# Patient Record
Sex: MALE | Race: WHITE | HISPANIC OR LATINO | URBAN - METROPOLITAN AREA
[De-identification: names, ages, dates, MRNs, and addresses within clinical notes are randomized per-mention and may not be internally consistent; named-entity substitution may affect disease eponyms.]

---

## 2019-06-16 ENCOUNTER — EMERGENCY (EMERGENCY)
Facility: HOSPITAL | Age: 44
LOS: 1 days | Discharge: HOME | End: 2019-06-16
Attending: EMERGENCY MEDICINE
Payer: COMMERCIAL

## 2019-06-16 VITALS
OXYGEN SATURATION: 95 % | RESPIRATION RATE: 16 BRPM | HEIGHT: 66 IN | HEART RATE: 81 BPM | DIASTOLIC BLOOD PRESSURE: 87 MMHG | SYSTOLIC BLOOD PRESSURE: 133 MMHG | TEMPERATURE: 99 F | WEIGHT: 240.08 LBS

## 2019-06-16 PROBLEM — Z00.00 ENCOUNTER FOR PREVENTIVE HEALTH EXAMINATION: Status: ACTIVE | Noted: 2019-06-16

## 2019-06-16 PROCEDURE — 73120 X-RAY EXAM OF HAND: CPT

## 2019-06-16 PROCEDURE — 99283 EMERGENCY DEPT VISIT LOW MDM: CPT

## 2019-06-16 PROCEDURE — 73120 X-RAY EXAM OF HAND: CPT | Mod: 26,RT

## 2019-06-16 RX ORDER — ACETAMINOPHEN 500 MG
975 TABLET ORAL ONCE
Refills: 0 | Status: COMPLETED | OUTPATIENT
Start: 2019-06-16 | End: 2019-06-16

## 2019-06-16 RX ADMIN — Medication 975 MILLIGRAM(S): at 11:26

## 2019-06-16 NOTE — ED PROVIDER NOTE - MUSCULOSKELETAL MINIMAL EXAM
R hand normal pulse. No R hand focal point tenderness. R hand ROM limited actively not clear if due to patient effort or reluctance to pain. Good strength in other muscles.

## 2019-06-16 NOTE — ED PROVIDER NOTE - CLINICAL SUMMARY MEDICAL DECISION MAKING FREE TEXT BOX
44 y/o M patient presents to the ED w/ R hand pain. Likely dislocation or fracture. No ligamentous injury. Will do X-Ray. Likely require splint and hand surgery. 44 y/o M patient presents to the ED w/ R hand pain. Likely dislocation or fracture. No ligamentous injury. Will do X-Ray. Likely require splint and hand surgery follow-up.

## 2019-06-16 NOTE — ED PROVIDER NOTE - CARE PROVIDER_API CALL
Mallory Garcia)  Plastic Surgery  [address] [place] [id number]  Phone: [phone]  Fax: [phone]  Follow Up Time: 4-6 Days

## 2019-06-16 NOTE — ED PROCEDURE NOTE - NS ED PROC PERFORMED BY1 FT
padilla
impairments found/rehab potential/anticipated discharge recommendation/functional limitations in following categories

## 2019-06-16 NOTE — ED PROVIDER NOTE - NSFOLLOWUPINSTRUCTIONS_ED_ALL_ED_FT
Please follow up with your personal medical doctor in 24-48 hours.   Bring results from today to your visit.  Take 400mg of motrin or advil or ibuprofen (usually 2 tablets) every 4 hours for pain.   See hand surgeon within 1 wk.   If your symptoms change, get worse or if you have any new symptoms, come to the ER right away.  If you have any questions, call the ER at the phone number on this page.

## 2019-06-16 NOTE — ED PROVIDER NOTE - OBJECTIVE STATEMENT
44 y/o M patient presents to the ED w/ mild, sharp, constant R hand pain that began this morning after patient was lifting luggage at work. Patient reports of pain all over in his thumb. Patient notes he applied ice with mild relief and he notes he is a righty. NKDA.

## 2022-08-23 NOTE — ED PROVIDER NOTE - NO SIGNIFICANT PAST SURGICAL HISTORY
Addended by: ANN MARIE BARRAZA on: 8/23/2022 11:03 AM     Modules accepted: Level of Service    
<<----- Click to add NO significant Past Surgical History

## 2025-04-11 NOTE — ED ADULT NURSE NOTE - CAS TRG GEN SKIN COLOR
Normal for race [Follow-Up Visit] : a follow-up visit for [Foot/Ankle Ulcer] : foot/ankle ulcer [FreeTextEntry2] : left 2nd toe wound